# Patient Record
Sex: MALE | NOT HISPANIC OR LATINO
[De-identification: names, ages, dates, MRNs, and addresses within clinical notes are randomized per-mention and may not be internally consistent; named-entity substitution may affect disease eponyms.]

---
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
## 2024-11-18 ENCOUNTER — HOSPITAL ENCOUNTER (OUTPATIENT)
Age: 24
Discharge: HOME | End: 2024-11-18
Payer: COMMERCIAL

## 2024-11-18 VITALS
SYSTOLIC BLOOD PRESSURE: 120 MMHG | OXYGEN SATURATION: 98 % | RESPIRATION RATE: 15 BRPM | BODY MASS INDEX: 22.53 KG/M2 | HEIGHT: 73 IN | WEIGHT: 170 LBS | DIASTOLIC BLOOD PRESSURE: 78 MMHG | TEMPERATURE: 97 F | HEART RATE: 65 BPM

## 2024-11-18 DIAGNOSIS — H57.89 REDNESS OF EYE, RIGHT: Primary | ICD-10-CM

## 2024-11-18 PROCEDURE — 99203 OFFICE O/P NEW LOW 30 MIN: CPT

## 2024-11-18 RX ORDER — TETRACAINE HYDROCHLORIDE 5 MG/ML
1 SOLUTION OPHTHALMIC ONCE
Status: COMPLETED | OUTPATIENT
Start: 2024-11-18 | End: 2024-11-18

## 2024-11-18 RX ORDER — OFLOXACIN 3 MG/ML
1 SOLUTION/ DROPS OPHTHALMIC 4 TIMES DAILY
Qty: 1 EACH | Refills: 0 | Status: SHIPPED | OUTPATIENT
Start: 2024-11-18 | End: 2024-11-25

## 2024-11-19 NOTE — ED PROVIDER NOTES
Patient Seen in: [facility]      History     Chief Complaint   Patient presents with    Eye Visual Problem     Stated Complaint: eye irritation    Subjective:   HPI  23 yo male who wears contacts presents with right eye irritation feeling and redness since Saturday. Denies visual changes or drainage. Denies cough or cold symptoms.    Objective:     History reviewed. No pertinent past medical history.           History reviewed. No pertinent surgical history.             Social History     Socioeconomic History    Marital status: Single   Tobacco Use    Smoking status: Never     Social Drivers of Health      Received from [facility]    Social Connections    Received from [facility]    Housing Stability              Review of Systems   All other systems reviewed and are negative.      Positive for stated complaint: eye irritation  Other systems are as noted in HPI.  Constitutional and vital signs reviewed.      All other systems reviewed and negative except as noted above.    Physical Exam     ED Triage Vitals [11/18/24 1834]   /78   Pulse 65   Resp 15   Temp 97.2 °F (36.2 °C)   Temp src Temporal   SpO2 98 %   O2 Device None (Room air)       Current Vitals:   Vital Signs  BP: 120/78  Pulse: 65  Resp: 15  Temp: 97.2 °F (36.2 °C)  Temp src: Temporal    Oxygen Therapy  SpO2: 98 %  O2 Device: None (Room air)      Right Eye Chart Acuity: 20/40, Corrected  Left Eye Chart Acuity: 20/40, Corrected  Physical Exam  Vitals and nursing note reviewed.   Constitutional:       General: He is not in acute distress.     Appearance: He is well-developed. He is not ill-appearing or toxic-appearing.   Eyes:      General:         Right eye: No foreign body or discharge.      Extraocular Movements: Extraocular movements intact.      Right eye: No nystagmus.      Left eye: No nystagmus.      Conjunctiva/sclera:      Right eye: Right conjunctiva is injected.      Pupils: Pupils  are equal, round, and reactive to light.      Comments: No consensual photophobia or photophobia. No fluorescein uptake or corneal abrasion. Conjunctival and scleral injection without drainage. No proptosis or pain with eom   Cardiovascular:      Rate and Rhythm: Normal rate.   Pulmonary:      Effort: Pulmonary effort is normal.   Skin:     General: Skin is warm and dry.   Neurological:      Mental Status: He is alert and oriented to person, place, and time.             ED Course   Labs Reviewed - No data to display                MDM     Medical Decision Making  25 yo male who wears contacts presents with right eye irritation feeling and redness since Saturday. Denies visual changes or drainage. Denies cough or cold symptoms.    Patient coming in with eye irritation .   Differential diagnosis includes but not limited to corneal abrasion, eye irritation, eye redness, conjunctivitis, iritis, keratitis  Will treat for eye redness.  Will discharge on ofloxacin with follow-up with ophthalmology. Patient/Parent is comfortable with this plan.    Overall Pt looks good. Non-toxic, well-hydrated and in no respiratory distress. Vital signs are reassuring. Exam is reassuring. I do not believe pt requires and additional diagnostic studies or intervention. I believe pt can be discharged home to continue evaluation as an outpatient. Follow-up provider given. Discharge instructions given and reviewed. Return for any problems. All understand and agree with the plan.        Problems Addressed:  Redness of eye, right: acute illness or injury        Disposition and Plan     Clinical Impression:  1. Redness of eye, right         Disposition:  Discharge  11/18/2024  7:00 pm    Follow-up:  Ramon Fountain MD  [address] [facility] [address]  [phone]    Call       Lukasz Herrera MD  [address] [place] [id number]  [phone]    Call       Santino Chaidez MD  [address] [facility]  [address]  [phone]    Call       Riccardo Mcdonough MD  [address] [facility]  [address]  [facility] [address]  [phone]    Call             Medications Prescribed:  Current Discharge Medication List        START taking these medications    Details   ofloxacin 0.3 % Ophthalmic Solution Place 1 drop into the right eye 4 (four) times daily for 7 days.  Qty: 1 each, Refills: 0                 Supplementary Documentation: